# Patient Record
Sex: FEMALE | URBAN - METROPOLITAN AREA
[De-identification: names, ages, dates, MRNs, and addresses within clinical notes are randomized per-mention and may not be internally consistent; named-entity substitution may affect disease eponyms.]

---

## 2023-07-06 ENCOUNTER — OFFICE VISIT (OUTPATIENT)
Dept: URBAN - METROPOLITAN AREA CLINIC 6 | Facility: CLINIC | Age: 38
End: 2023-07-06

## 2023-07-06 DIAGNOSIS — H16.011 CORNEAL CORNEAL ULCER OF RIGHT EYE: Primary | ICD-10-CM

## 2023-07-06 PROCEDURE — 92002 INTRM OPH EXAM NEW PATIENT: CPT | Performed by: OPTOMETRIST

## 2023-07-06 RX ORDER — NEOMYCIN SULFATE, POLYMYXIN B SULFATE AND DEXAMETHASONE 3.5; 10000; 1 MG/ML; [USP'U]/ML; MG/ML
SUSPENSION OPHTHALMIC
Qty: 5 | Refills: 0 | Status: ACTIVE
Start: 2023-07-06

## 2023-07-06 ASSESSMENT — INTRAOCULAR PRESSURE
OS: 15
OD: 10

## 2023-07-06 NOTE — IMPRESSION/PLAN
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 Plan: 1mm non-severe corneal ulceration 3 o clock with sterile infiltrates. Start Maxitrol gtts QID OU. Discontinue contact lens use.

## 2023-07-13 ENCOUNTER — OFFICE VISIT (OUTPATIENT)
Dept: URBAN - METROPOLITAN AREA CLINIC 6 | Facility: CLINIC | Age: 38
End: 2023-07-13

## 2023-07-13 DIAGNOSIS — H16.011 CORNEAL CORNEAL ULCER OF RIGHT EYE: Primary | ICD-10-CM

## 2023-07-13 PROCEDURE — 92012 INTRM OPH EXAM EST PATIENT: CPT | Performed by: OPTOMETRIST

## 2023-07-13 ASSESSMENT — INTRAOCULAR PRESSURE
OS: 21
OD: 18

## 2023-07-13 NOTE — IMPRESSION/PLAN
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 Plan: Resolved.  D/C Maxitrol QID and advised new MRx with outside optometrist.